# Patient Record
Sex: MALE | Race: WHITE | NOT HISPANIC OR LATINO | URBAN - METROPOLITAN AREA
[De-identification: names, ages, dates, MRNs, and addresses within clinical notes are randomized per-mention and may not be internally consistent; named-entity substitution may affect disease eponyms.]

---

## 2021-08-10 ENCOUNTER — APPOINTMENT (RX ONLY)
Dept: URBAN - METROPOLITAN AREA CLINIC 116 | Facility: CLINIC | Age: 56
Setting detail: DERMATOLOGY
End: 2021-08-10

## 2021-08-10 DIAGNOSIS — D49.2 NEOPLASM OF UNSPECIFIED BEHAVIOR OF BONE, SOFT TISSUE, AND SKIN: ICD-10-CM

## 2021-08-10 DIAGNOSIS — L91.8 OTHER HYPERTROPHIC DISORDERS OF THE SKIN: ICD-10-CM

## 2021-08-10 DIAGNOSIS — B36.0 PITYRIASIS VERSICOLOR: ICD-10-CM

## 2021-08-10 PROCEDURE — ? PRESCRIPTION

## 2021-08-10 PROCEDURE — ? TREATMENT REGIMEN

## 2021-08-10 PROCEDURE — 11102 TANGNTL BX SKIN SINGLE LES: CPT

## 2021-08-10 PROCEDURE — ? COUNSELING

## 2021-08-10 PROCEDURE — ? BIOPSY BY SHAVE METHOD

## 2021-08-10 PROCEDURE — ? SKIN TAG REMOVAL

## 2021-08-10 PROCEDURE — 11200 RMVL SKIN TAGS UP TO&INC 15: CPT | Mod: 59

## 2021-08-10 PROCEDURE — 99203 OFFICE O/P NEW LOW 30 MIN: CPT | Mod: 25

## 2021-08-10 RX ORDER — CICLOPIROX OLAMINE 7.7 MG/G
CREAM TOPICAL BID
Qty: 1 | Refills: 3 | Status: ERX | COMMUNITY
Start: 2021-08-10

## 2021-08-10 RX ADMIN — CICLOPIROX OLAMINE: 7.7 CREAM TOPICAL at 00:00

## 2021-08-10 ASSESSMENT — LOCATION ZONE DERM
LOCATION ZONE: TRUNK
LOCATION ZONE: NECK

## 2021-08-10 ASSESSMENT — LOCATION SIMPLE DESCRIPTION DERM
LOCATION SIMPLE: RIGHT ANTERIOR NECK
LOCATION SIMPLE: CHEST
LOCATION SIMPLE: UPPER BACK

## 2021-08-10 ASSESSMENT — LOCATION DETAILED DESCRIPTION DERM
LOCATION DETAILED: LEFT LATERAL INFERIOR CHEST
LOCATION DETAILED: SUPERIOR THORACIC SPINE
LOCATION DETAILED: RIGHT CLAVICULAR NECK

## 2021-08-10 NOTE — PROCEDURE: BIOPSY BY SHAVE METHOD
Detail Level: Detailed
Depth Of Biopsy: dermis
Was A Bandage Applied: Yes
Size Of Lesion In Cm: 1
X Size Of Lesion In Cm: 0
Biopsy Type: H and E
Biopsy Method: scissors
Anesthesia Type: 1% lidocaine without epinephrine
Hemostasis: Aluminum Chloride
Wound Care: Vaseline
Dressing: pressure dressing with telfa
Destruction After The Procedure: No
Type Of Destruction Used: Curettage
Curettage Text: The wound bed was treated with curettage after the biopsy was performed.
Cryotherapy Text: The wound bed was treated with cryotherapy after the biopsy was performed.
Electrodesiccation Text: The wound bed was treated with electrodesiccation after the biopsy was performed.
Electrodesiccation And Curettage Text: The wound bed was treated with electrodesiccation and curettage after the biopsy was performed.
Silver Nitrate Text: The wound bed was treated with silver nitrate after the biopsy was performed.
Lab: [facility] [place]
Lab Facility: [address]
Consent: The provider's intent is to obtain a tissue sample solely for diagnostic purposes. Written consent to obtain tissue sample was obtained and risks were reviewed including but not limited to scarring, infection, bleeding, scabbing, incomplete removal, nerve damage and allergy to anesthesia.
Post-Care Instructions: I reviewed with the patient in detail post-care instructions. Patient is to keep the biopsy site dry overnight, and then apply bacitracin twice daily until healed. Patient may apply hydrogen peroxide soaks to remove any crusting.
Notification Instructions: Patient will be notified of biopsy results. However, patient instructed to call the office if not contacted within 2 weeks.
Billing Type: [employer]
Information: Selecting Yes will display possible errors in your note based on the variables you have selected. This validation is only offered as a suggestion for you. PLEASE NOTE THAT THE VALIDATION TEXT WILL BE REMOVED WHEN YOU FINALIZE YOUR NOTE. IF YOU WANT TO FAX A PRELIMINARY NOTE YOU WILL NEED TO TOGGLE THIS TO 'NO' IF YOU DO NOT WANT IT IN YOUR FAXED NOTE.

## 2021-08-10 NOTE — PROCEDURE: SKIN TAG REMOVAL
Medical Necessity Clause: This procedure was medically necessary because the lesions that were treated were:
Add Associated Diagnoses If Applicable When Selecting Medical Necessity: Yes
Include Z78.9 (Other Specified Conditions Influencing Health Status) As An Associated Diagnosis?: No
Detail Level: Simple
Consent: Written consent obtained and the risks of skin tag removal was reviewed with the patient including but not limited to bleeding, pigmentary change, infection, pain, and remote possibility of scarring.
Anesthesia Volume In Cc: 3
Anesthesia Type: 1% lidocaine with epinephrine
Medical Necessity Information: It is in your best interest to select a reason for this procedure from the list below. All of these items fulfill various CMS LCD requirements except the new and changing color options.

## 2022-04-19 ENCOUNTER — OFFICE VISIT (OUTPATIENT)
Dept: URBAN - METROPOLITAN AREA CLINIC 7 | Facility: CLINIC | Age: 57
End: 2022-04-19

## 2022-05-27 ENCOUNTER — OFFICE VISIT (OUTPATIENT)
Dept: URBAN - METROPOLITAN AREA SURGERY CENTER 5 | Facility: SURGERY CENTER | Age: 57
End: 2022-05-27

## 2022-06-16 ENCOUNTER — TELEPHONE ENCOUNTER (OUTPATIENT)
Dept: URBAN - METROPOLITAN AREA CLINIC 9 | Facility: CLINIC | Age: 57
End: 2022-06-16

## 2022-06-28 ENCOUNTER — OFFICE VISIT (OUTPATIENT)
Dept: URBAN - METROPOLITAN AREA CLINIC 7 | Facility: CLINIC | Age: 57
End: 2022-06-28

## 2022-07-01 ENCOUNTER — TELEPHONE ENCOUNTER (OUTPATIENT)
Dept: URBAN - METROPOLITAN AREA CLINIC 9 | Facility: CLINIC | Age: 57
End: 2022-07-01

## 2022-07-14 ENCOUNTER — TELEPHONE ENCOUNTER (OUTPATIENT)
Dept: URBAN - METROPOLITAN AREA CLINIC 9 | Facility: CLINIC | Age: 57
End: 2022-07-14

## 2022-07-30 ENCOUNTER — TELEPHONE ENCOUNTER (OUTPATIENT)
Age: 57
End: 2022-07-30

## 2022-07-31 ENCOUNTER — TELEPHONE ENCOUNTER (OUTPATIENT)
Age: 57
End: 2022-07-31

## 2022-07-31 RX ORDER — PANTOPRAZOLE SODIUM 40 MG/1
1 (ONE) TABLET, DELAYED RELEASE ORAL
Qty: 0 | Refills: 16 | Status: ACTIVE | COMMUNITY
Start: 2022-06-16

## 2022-07-31 RX ORDER — PANTOPRAZOLE SODIUM 40 MG/1
1 (ONE) TABLET, DELAYED RELEASE ORAL
Qty: 0 | Refills: 16 | Status: ACTIVE | COMMUNITY
Start: 2022-06-14

## 2022-09-12 ENCOUNTER — TELEPHONE ENCOUNTER (OUTPATIENT)
Dept: URBAN - METROPOLITAN AREA CLINIC 9 | Facility: CLINIC | Age: 57
End: 2022-09-12

## 2022-09-12 RX ORDER — PANTOPRAZOLE SODIUM 40 MG/1
1 (ONE) TABLET, DELAYED RELEASE ORAL
Qty: 90 | Refills: 3

## 2022-09-13 ENCOUNTER — TELEPHONE ENCOUNTER (OUTPATIENT)
Dept: URBAN - METROPOLITAN AREA CLINIC 7 | Facility: CLINIC | Age: 57
End: 2022-09-13

## 2023-06-21 ENCOUNTER — LAB OUTSIDE AN ENCOUNTER (OUTPATIENT)
Dept: URBAN - METROPOLITAN AREA CLINIC 7 | Facility: CLINIC | Age: 58
End: 2023-06-21

## 2023-06-21 ENCOUNTER — OFFICE VISIT (OUTPATIENT)
Dept: URBAN - METROPOLITAN AREA CLINIC 7 | Facility: CLINIC | Age: 58
End: 2023-06-21
Payer: MEDICARE

## 2023-06-21 ENCOUNTER — WEB ENCOUNTER (OUTPATIENT)
Dept: URBAN - METROPOLITAN AREA CLINIC 7 | Facility: CLINIC | Age: 58
End: 2023-06-21

## 2023-06-21 VITALS
DIASTOLIC BLOOD PRESSURE: 70 MMHG | HEIGHT: 68 IN | WEIGHT: 276 LBS | BODY MASS INDEX: 41.83 KG/M2 | TEMPERATURE: 97.9 F | SYSTOLIC BLOOD PRESSURE: 120 MMHG

## 2023-06-21 DIAGNOSIS — K76.0 HEPATIC STEATOSIS: ICD-10-CM

## 2023-06-21 DIAGNOSIS — K22.70 BARRETT'S ESOPHAGUS: ICD-10-CM

## 2023-06-21 DIAGNOSIS — R10.32 LLQ PAIN: ICD-10-CM

## 2023-06-21 PROCEDURE — 99214 OFFICE O/P EST MOD 30 MIN: CPT | Performed by: INTERNAL MEDICINE

## 2023-06-21 RX ORDER — AMOXICILLIN AND CLAVULANATE POTASSIUM 875; 125 MG/1; MG/1
1 TABLET TABLET, FILM COATED ORAL EVERY 8 HOURS
Qty: 30 | Refills: 0 | OUTPATIENT
Start: 2023-06-21 | End: 2023-07-01

## 2023-06-21 RX ORDER — LISINOPRIL AND HYDROCHLOROTHIAZIDE 10; 12.5 MG/1; MG/1
1 TABLET TABLET ORAL ONCE A DAY
Status: ACTIVE | COMMUNITY

## 2023-06-21 RX ORDER — PANTOPRAZOLE SODIUM 40 MG/1
1 (ONE) TABLET, DELAYED RELEASE ORAL
Qty: 90 | Refills: 3 | Status: ACTIVE | COMMUNITY

## 2023-06-21 NOTE — HPI-TODAY'S VISIT:
Patient was last seen in the office in June 2022.  His initial evaluation was in April 2022 for chronic phlegm production with LPR per ENT no true or typical reflux symptoms and no dysphagia.  He did have an upper endoscopy several years ago with possible esophagitis while being worked up for bladder cancer.  Barium swallow more recently did demonstrate reflux and a small hiatal hernia.  EGD May 2022 demonstrated grade a esophagitis with a small hiatal hernia and a colonoscopy in May 2022 demonstrated 1 tubular adenoma with biopsies from the esophagus demonstrating possible Farr's and reflux changes.  AST and ALT were 45 and 65 respectively with a normal CBC and normal creatinine.  PPI was started daily.  The plan was to repeat his upper endoscopy in 1 year to reassess the Farr's diagnosis as a did question his Farr's diagnosis given he had inflammation at the time of the initial biopsies.  He did have evidence of fatty liver and elevated LFTs so did advise diet and exercise and modest efforts at weight loss.  His symptoms of phlegm production are chronic, to see if this improves along with acid suppression.  He did also have left sided abdominal spasms and plan was for CT scan.  He ultimately did have a CT scan with oral contrast but not IV contrast as an IV could not be started for the contrast.  He was treated with pantoprazole 40 mg once daily since that time.  CT demonstrated fatty liver, colon diverticulosis without inflammation, 2 mm right lower lobe pulmonary nodule, 9 mm left renal cyst.  Pancreas was normal, there is no lymphadenopathy. He had a CT 3/2023 sigmoid diverticulitis. Treated with antibiotics, which helped, and symptoms resolved. ALT 66. She is on pantoprazole 40 mg daily right now. Weight is down 15 lbs. Still has some phlegm issues in the throat. Labs were largely normal in March 2023.

## 2023-06-22 ENCOUNTER — TELEPHONE ENCOUNTER (OUTPATIENT)
Dept: URBAN - METROPOLITAN AREA CLINIC 8 | Facility: CLINIC | Age: 58
End: 2023-06-22

## 2023-06-22 ENCOUNTER — WEB ENCOUNTER (OUTPATIENT)
Dept: URBAN - METROPOLITAN AREA CLINIC 7 | Facility: CLINIC | Age: 58
End: 2023-06-22

## 2023-06-22 RX ORDER — AMOXICILLIN AND CLAVULANATE POTASSIUM 875; 125 MG/1; MG/1
1 TABLET TABLET, FILM COATED ORAL EVERY 8 HOURS
Qty: 30 | Refills: 0 | Status: ACTIVE | COMMUNITY
Start: 2023-06-21 | End: 2023-07-01

## 2023-06-22 RX ORDER — PANTOPRAZOLE SODIUM 40 MG/1
1 (ONE) TABLET, DELAYED RELEASE ORAL
Qty: 90 | Refills: 3 | Status: ACTIVE | COMMUNITY

## 2023-06-22 RX ORDER — LISINOPRIL AND HYDROCHLOROTHIAZIDE 10; 12.5 MG/1; MG/1
1 TABLET TABLET ORAL ONCE A DAY
Status: ACTIVE | COMMUNITY

## 2023-06-22 RX ORDER — DICYCLOMINE HYDROCHLORIDE 10 MG/1
AS DIRECTED CAPSULE ORAL
Qty: 60 | Refills: 3 | OUTPATIENT
Start: 2023-06-22 | End: 2023-10-20

## 2023-08-14 ENCOUNTER — WEB ENCOUNTER (OUTPATIENT)
Dept: URBAN - METROPOLITAN AREA CLINIC 7 | Facility: CLINIC | Age: 58
End: 2023-08-14

## 2023-08-22 ENCOUNTER — CLAIMS CREATED FROM THE CLAIM WINDOW (OUTPATIENT)
Dept: URBAN - METROPOLITAN AREA CLINIC 4 | Facility: CLINIC | Age: 58
End: 2023-08-22
Payer: MEDICARE

## 2023-08-22 ENCOUNTER — CLAIMS CREATED FROM THE CLAIM WINDOW (OUTPATIENT)
Dept: URBAN - METROPOLITAN AREA SURGERY CENTER 5 | Facility: SURGERY CENTER | Age: 58
End: 2023-08-22
Payer: MEDICARE

## 2023-08-22 DIAGNOSIS — K22.89 OTHER SPECIFIED DISEASE OF ESOPHAGUS: ICD-10-CM

## 2023-08-22 DIAGNOSIS — Z87.19 PERSONAL HISTORY OF OTHER DISEASES OF THE DIGESTIVE SYSTEM: ICD-10-CM

## 2023-08-22 DIAGNOSIS — K22.719 BARRETT'S ESOPHAGUS WITH DYSPLASIA, UNSPECIFIED: ICD-10-CM

## 2023-08-22 PROCEDURE — 43239 EGD BIOPSY SINGLE/MULTIPLE: CPT | Performed by: INTERNAL MEDICINE

## 2023-08-22 PROCEDURE — 43239 EGD BIOPSY SINGLE/MULTIPLE: CPT | Performed by: CLINIC/CENTER

## 2023-08-22 PROCEDURE — 88305 TISSUE EXAM BY PATHOLOGIST: CPT | Performed by: PATHOLOGY

## 2023-08-22 RX ORDER — DICYCLOMINE HYDROCHLORIDE 10 MG/1
AS DIRECTED CAPSULE ORAL
Qty: 60 | Refills: 3 | Status: ACTIVE | COMMUNITY
Start: 2023-06-22 | End: 2023-10-20

## 2023-08-22 RX ORDER — LISINOPRIL AND HYDROCHLOROTHIAZIDE 10; 12.5 MG/1; MG/1
1 TABLET TABLET ORAL ONCE A DAY
Status: ACTIVE | COMMUNITY

## 2023-08-22 RX ORDER — PANTOPRAZOLE SODIUM 40 MG/1
1 (ONE) TABLET, DELAYED RELEASE ORAL
Qty: 90 | Refills: 3 | Status: ACTIVE | COMMUNITY

## 2023-10-19 ENCOUNTER — OFFICE VISIT (OUTPATIENT)
Dept: URBAN - METROPOLITAN AREA CLINIC 7 | Facility: CLINIC | Age: 58
End: 2023-10-19
Payer: MEDICARE

## 2023-10-19 VITALS
BODY MASS INDEX: 41.98 KG/M2 | WEIGHT: 277 LBS | HEIGHT: 68 IN | DIASTOLIC BLOOD PRESSURE: 74 MMHG | HEART RATE: 80 BPM | TEMPERATURE: 97.8 F | SYSTOLIC BLOOD PRESSURE: 122 MMHG

## 2023-10-19 DIAGNOSIS — K76.0 HEPATIC STEATOSIS: ICD-10-CM

## 2023-10-19 DIAGNOSIS — R10.32 LLQ PAIN: ICD-10-CM

## 2023-10-19 DIAGNOSIS — K22.70 BARRETT'S ESOPHAGUS: ICD-10-CM

## 2023-10-19 PROCEDURE — 99214 OFFICE O/P EST MOD 30 MIN: CPT | Performed by: INTERNAL MEDICINE

## 2023-10-19 RX ORDER — LISINOPRIL AND HYDROCHLOROTHIAZIDE 10; 12.5 MG/1; MG/1
1 TABLET TABLET ORAL ONCE A DAY
Status: ACTIVE | COMMUNITY

## 2023-10-19 RX ORDER — FAMOTIDINE 40 MG/1
1 TABLET AT BEDTIME TABLET, FILM COATED ORAL ONCE A DAY
Qty: 30 | Refills: 3 | OUTPATIENT
Start: 2023-10-19

## 2023-10-19 RX ORDER — PANTOPRAZOLE SODIUM 40 MG/1
1 (ONE) TABLET, DELAYED RELEASE ORAL
Qty: 90 | Refills: 3 | Status: ACTIVE | COMMUNITY

## 2023-10-19 RX ORDER — DICYCLOMINE HYDROCHLORIDE 10 MG/1
AS DIRECTED CAPSULE ORAL
Qty: 60 | Refills: 3 | Status: DISCONTINUED | COMMUNITY
Start: 2023-06-22 | End: 2023-10-20

## 2023-10-19 NOTE — HPI-TODAY'S VISIT:
Patient was last seen in the office in June 2022.  His initial evaluation was in April 2022 for chronic phlegm production with LPR per ENT no true or typical reflux symptoms and no dysphagia.  He did have an upper endoscopy several years ago with possible esophagitis while being worked up for bladder cancer.  Barium swallow more recently did demonstrate reflux and a small hiatal hernia.  EGD May 2022 demonstrated grade a esophagitis with a small hiatal hernia and a colonoscopy in May 2022 demonstrated 1 tubular adenoma with biopsies from the esophagus demonstrating possible Farr's and reflux changes.  AST and ALT were 45 and 65 respectively with a normal CBC and normal creatinine.  PPI was started daily.  The plan was to repeat his upper endoscopy in 1 year to reassess the Farr's diagnosis as a did question his Farr's diagnosis given he had inflammation at the time of the initial biopsies.  He did have evidence of fatty liver and elevated LFTs so did advise diet and exercise and modest efforts at weight loss.  His symptoms of phlegm production are chronic, to see if this improves along with acid suppression.  He did also have left sided abdominal spasms and plan was for CT scan.  He ultimately did have a CT scan with oral contrast but not IV contrast as an IV could not be started for the contrast.  He was treated with pantoprazole 40 mg once daily since that time.  CT demonstrated fatty liver, colon diverticulosis without inflammation, 2 mm right lower lobe pulmonary nodule, 9 mm left renal cyst.  Pancreas was normal, there is no lymphadenopathy. He had a CT 3/2023 sigmoid diverticulitis. Treated with antibiotics, which helped, and symptoms resolved. ALT 66. She is on pantoprazole 40 mg daily right now. Weight is down 15 lbs. Still has some phlegm issues in the throat. Labs were largely normal in March 2023.  Plan last visit was to get a stat C. difficile test, CT scan, and ultimately pursue his EGD to tease out his diagnosis of Farr's esophagus.  He did improve on PPI therapy as far as his hoarseness and cough and so it was favored that these represented symptoms of reflux.  EGD in August 2023 did demonstrate a variable Z-line with otherwise normal exam and biopsies which demonstrated Farr's esophagus.  I did decide to keep him on once daily PPI and repeat EGD in 3 years for surveillance.  CT scan in June 2023 demonstrated diverticulosis but no evidence of bowel inflammation, no enlarged lymph nodes, normal pancreas.  I sent dicyclomine to his pharmacy to help with the pain but decided to hold off on any further antibiotics. He is here for FU of his EGD. Stools became more normal. He is on pantoprazole 40 mg daily, phlegm is still an issue, mucous production. No dysphagia.

## 2023-11-27 ENCOUNTER — WEB ENCOUNTER (OUTPATIENT)
Dept: URBAN - METROPOLITAN AREA CLINIC 7 | Facility: CLINIC | Age: 58
End: 2023-11-27

## 2023-11-27 RX ORDER — PANTOPRAZOLE SODIUM 40 MG/1
1 (ONE) TABLET, DELAYED RELEASE ORAL
Qty: 90 | Refills: 3

## 2024-01-09 ENCOUNTER — OFFICE VISIT (OUTPATIENT)
Dept: URBAN - METROPOLITAN AREA CLINIC 7 | Facility: CLINIC | Age: 59
End: 2024-01-09
Payer: MEDICARE

## 2024-01-09 ENCOUNTER — DASHBOARD ENCOUNTERS (OUTPATIENT)
Age: 59
End: 2024-01-09

## 2024-01-09 VITALS
BODY MASS INDEX: 41.83 KG/M2 | SYSTOLIC BLOOD PRESSURE: 130 MMHG | DIASTOLIC BLOOD PRESSURE: 76 MMHG | HEIGHT: 68 IN | TEMPERATURE: 97.6 F | WEIGHT: 276 LBS

## 2024-01-09 DIAGNOSIS — R09.82 POST-NASAL DRIP: ICD-10-CM

## 2024-01-09 DIAGNOSIS — R10.32 LLQ PAIN: ICD-10-CM

## 2024-01-09 DIAGNOSIS — K22.70 BARRETT'S ESOPHAGUS: ICD-10-CM

## 2024-01-09 DIAGNOSIS — K76.0 HEPATIC STEATOSIS: ICD-10-CM

## 2024-01-09 DIAGNOSIS — R05.3 CHRONIC COUGH: ICD-10-CM

## 2024-01-09 PROBLEM — 75803007: Status: ACTIVE | Noted: 2024-01-09

## 2024-01-09 PROBLEM — 68154008: Status: ACTIVE | Noted: 2024-01-09

## 2024-01-09 PROCEDURE — 99214 OFFICE O/P EST MOD 30 MIN: CPT | Performed by: INTERNAL MEDICINE

## 2024-01-09 RX ORDER — LORATADINE 10 MG
1 TABLET TABLET ORAL ONCE A DAY
Qty: 30 | Refills: 1 | OUTPATIENT
Start: 2024-01-09 | End: 2024-03-09

## 2024-01-09 RX ORDER — PANTOPRAZOLE SODIUM 40 MG/1
1 (ONE) TABLET, DELAYED RELEASE ORAL
Qty: 90 | Refills: 3 | Status: ACTIVE | COMMUNITY

## 2024-01-09 RX ORDER — FAMOTIDINE 40 MG/1
1 TABLET AT BEDTIME TABLET, FILM COATED ORAL ONCE A DAY
Qty: 30 | Refills: 3 | Status: ACTIVE | COMMUNITY
Start: 2023-10-19

## 2024-01-09 RX ORDER — TRIAMCINOLONE ACETONIDE 55 UG/1
1 SPRAY IN EACH NOSTRIL SPRAY, METERED NASAL ONCE A DAY
Qty: 1 APPLICATOR | Refills: 3 | OUTPATIENT
Start: 2024-01-09

## 2024-01-09 RX ORDER — FLUTICASONE PROPIONATE 50 UG/1
1 SPRAY IN EACH NOSTRIL SPRAY, METERED NASAL TWICE A DAY
Qty: 1 | Refills: 1 | OUTPATIENT
Start: 2024-01-09

## 2024-01-09 RX ORDER — LISINOPRIL AND HYDROCHLOROTHIAZIDE 10; 12.5 MG/1; MG/1
1 TABLET TABLET ORAL ONCE A DAY
Status: ACTIVE | COMMUNITY

## 2024-01-09 NOTE — HPI-TODAY'S VISIT:
BALDEMAR 10/2023. His initial evaluation was in April 2022 for chronic phlegm production with LPR per ENT no true or typical reflux symptoms and no dysphagia.  He did have an upper endoscopy several years ago with possible esophagitis while being worked up for bladder cancer.  Barium swallow more recently did demonstrate reflux and a small hiatal hernia.  EGD May 2022 demonstrated grade a esophagitis with a small hiatal hernia and a colonoscopy in May 2022 demonstrated 1 tubular adenoma with biopsies from the esophagus demonstrating possible Farr's and reflux changes.  AST and ALT were 45 and 65 respectively with a normal CBC and normal creatinine.  PPI was started daily.  The plan was to repeat his upper endoscopy in 1 year to reassess the Farr's diagnosis as a did question his Farr's diagnosis given he had inflammation at the time of the initial biopsies.  He did have evidence of fatty liver and elevated LFTs so did advise diet and exercise and modest efforts at weight loss.  His symptoms of phlegm production are chronic, to see if this improves along with acid suppression.  He did also have left sided abdominal spasms and plan was for CT scan.  He ultimately did have a CT scan with oral contrast but not IV contrast as an IV could not be started for the contrast.  He was treated with pantoprazole 40 mg once daily since that time.  CT demonstrated fatty liver, colon diverticulosis without inflammation, 2 mm right lower lobe pulmonary nodule, 9 mm left renal cyst.  Pancreas was normal, there is no lymphadenopathy. He had a CT 3/2023 sigmoid diverticulitis. Treated with antibiotics, which helped, and symptoms resolved. ALT 66. He is on pantoprazole 40 mg daily right now. Weight is down 15 lbs. Still has some phlegm issues in the throat. Labs were largely normal in March 2023. Plan last visit was to get a stat C. difficile test, CT scan, and ultimately pursue his EGD to tease out his diagnosis of Farr's esophagus.  He did improve on PPI therapy as far as his hoarseness and cough and so it was favored that these represented symptoms of reflux.  EGD in August 2023 did demonstrate a variable Z-line with otherwise normal exam and biopsies which demonstrated Farr's esophagus.  I did decide to keep him on once daily PPI and repeat EGD in 3 years for surveillance.  CT scan in June 2023 demonstrated diverticulosis but no evidence of bowel inflammation, no enlarged lymph nodes, normal pancreas.  I sent dicyclomine to his pharmacy to help with the pain but decided to hold off on any further antibiotics. He is here for FU of his EGD. Stools became more normal. He is on pantoprazole 40 mg daily, phlegm is still an issue, mucous production. No dysphagia.  Last visit, plan was to do his EGD in 3 years for Farr's surveillance, continue his morning PPI, and added famotidine at bedtime to help with mucus and phlegm production in the case that this would be secondary to reflux. He tell me that famotidine did not really with cough, phlegm, mucous. No new symptoms. Continues pantoprazole.

## 2024-01-12 ENCOUNTER — WEB ENCOUNTER (OUTPATIENT)
Dept: URBAN - METROPOLITAN AREA CLINIC 7 | Facility: CLINIC | Age: 59
End: 2024-01-12

## 2024-01-12 RX ORDER — TRIAMCINOLONE ACETONIDE 55 UG/1
1 SPRAY IN EACH NOSTRIL SPRAY, METERED NASAL ONCE A DAY
Qty: 1 APPLICATOR | Refills: 3
Start: 2024-01-09

## 2024-05-21 ENCOUNTER — WEB ENCOUNTER (OUTPATIENT)
Dept: URBAN - METROPOLITAN AREA CLINIC 7 | Facility: CLINIC | Age: 59
End: 2024-05-21

## 2024-05-22 ENCOUNTER — LAB OUTSIDE AN ENCOUNTER (OUTPATIENT)
Dept: URBAN - METROPOLITAN AREA CLINIC 7 | Facility: CLINIC | Age: 59
End: 2024-05-22

## 2024-06-10 ENCOUNTER — LAB OUTSIDE AN ENCOUNTER (OUTPATIENT)
Dept: URBAN - METROPOLITAN AREA CLINIC 7 | Facility: CLINIC | Age: 59
End: 2024-06-10

## 2024-06-12 ENCOUNTER — LAB OUTSIDE AN ENCOUNTER (OUTPATIENT)
Dept: URBAN - METROPOLITAN AREA CLINIC 7 | Facility: CLINIC | Age: 59
End: 2024-06-12

## 2024-06-12 ENCOUNTER — TELEPHONE ENCOUNTER (OUTPATIENT)
Dept: URBAN - METROPOLITAN AREA CLINIC 7 | Facility: CLINIC | Age: 59
End: 2024-06-12

## 2024-06-14 ENCOUNTER — TELEPHONE ENCOUNTER (OUTPATIENT)
Dept: URBAN - METROPOLITAN AREA CLINIC 7 | Facility: CLINIC | Age: 59
End: 2024-06-14

## 2024-07-02 ENCOUNTER — WEB ENCOUNTER (OUTPATIENT)
Dept: URBAN - METROPOLITAN AREA CLINIC 7 | Facility: CLINIC | Age: 59
End: 2024-07-02

## 2024-07-03 ENCOUNTER — TELEPHONE ENCOUNTER (OUTPATIENT)
Dept: URBAN - METROPOLITAN AREA CLINIC 7 | Facility: CLINIC | Age: 59
End: 2024-07-03

## 2024-07-11 ENCOUNTER — WEB ENCOUNTER (OUTPATIENT)
Dept: URBAN - METROPOLITAN AREA CLINIC 7 | Facility: CLINIC | Age: 59
End: 2024-07-11

## 2024-07-12 ENCOUNTER — LAB OUTSIDE AN ENCOUNTER (OUTPATIENT)
Dept: URBAN - METROPOLITAN AREA CLINIC 7 | Facility: CLINIC | Age: 59
End: 2024-07-12

## 2024-07-23 ENCOUNTER — WEB ENCOUNTER (OUTPATIENT)
Dept: URBAN - METROPOLITAN AREA CLINIC 7 | Facility: CLINIC | Age: 59
End: 2024-07-23

## 2024-07-26 ENCOUNTER — TELEPHONE ENCOUNTER (OUTPATIENT)
Dept: URBAN - METROPOLITAN AREA CLINIC 7 | Facility: CLINIC | Age: 59
End: 2024-07-26

## 2024-07-26 ENCOUNTER — LAB OUTSIDE AN ENCOUNTER (OUTPATIENT)
Dept: URBAN - METROPOLITAN AREA CLINIC 7 | Facility: CLINIC | Age: 59
End: 2024-07-26

## 2024-07-31 ENCOUNTER — TELEPHONE ENCOUNTER (OUTPATIENT)
Dept: URBAN - METROPOLITAN AREA SURGERY CENTER 5 | Facility: SURGERY CENTER | Age: 59
End: 2024-07-31

## 2024-08-13 PROBLEM — 62484002: Status: ACTIVE | Noted: 2024-08-13

## 2024-08-14 ENCOUNTER — OFFICE VISIT (OUTPATIENT)
Dept: URBAN - METROPOLITAN AREA CLINIC 7 | Facility: CLINIC | Age: 59
End: 2024-08-14
Payer: MEDICARE

## 2024-08-14 ENCOUNTER — TELEPHONE ENCOUNTER (OUTPATIENT)
Dept: URBAN - METROPOLITAN AREA CLINIC 7 | Facility: CLINIC | Age: 59
End: 2024-08-14

## 2024-08-14 VITALS
RESPIRATION RATE: 16 BRPM | WEIGHT: 263 LBS | HEIGHT: 68 IN | SYSTOLIC BLOOD PRESSURE: 130 MMHG | BODY MASS INDEX: 39.86 KG/M2 | TEMPERATURE: 97.6 F | DIASTOLIC BLOOD PRESSURE: 70 MMHG

## 2024-08-14 DIAGNOSIS — K22.70 BARRETT'S ESOPHAGUS: ICD-10-CM

## 2024-08-14 DIAGNOSIS — K74.00 LIVER FIBROSIS: ICD-10-CM

## 2024-08-14 DIAGNOSIS — R09.82 POST-NASAL DRIP: ICD-10-CM

## 2024-08-14 DIAGNOSIS — R05.3 CHRONIC COUGH: ICD-10-CM

## 2024-08-14 DIAGNOSIS — K75.81 METABOLIC DYSFUNCTION-ASSOCIATED STEATOHEPATITIS (MASH): ICD-10-CM

## 2024-08-14 DIAGNOSIS — R10.32 LLQ PAIN: ICD-10-CM

## 2024-08-14 DIAGNOSIS — K76.0 HEPATIC STEATOSIS: ICD-10-CM

## 2024-08-14 PROCEDURE — 99214 OFFICE O/P EST MOD 30 MIN: CPT | Performed by: INTERNAL MEDICINE

## 2024-08-14 RX ORDER — FAMOTIDINE 40 MG/1
1 TABLET AT BEDTIME TABLET, FILM COATED ORAL ONCE A DAY
Qty: 30 | Refills: 3 | Status: ACTIVE | COMMUNITY
Start: 2023-10-19

## 2024-08-14 RX ORDER — PANTOPRAZOLE SODIUM 40 MG/1
1 (ONE) TABLET, DELAYED RELEASE ORAL
Qty: 90 | Refills: 3 | Status: ACTIVE | COMMUNITY

## 2024-08-14 RX ORDER — RESMETIROM 100 MG/1
1 TABLET TABLET, COATED ORAL ONCE DAILY
Qty: 90 | Refills: 3 | OUTPATIENT
Start: 2024-08-14

## 2024-08-14 RX ORDER — LISINOPRIL AND HYDROCHLOROTHIAZIDE 10; 12.5 MG/1; MG/1
1 TABLET TABLET ORAL ONCE A DAY
Status: ACTIVE | COMMUNITY

## 2024-08-14 RX ORDER — TRIAMCINOLONE ACETONIDE 55 UG/1
1 SPRAY IN EACH NOSTRIL SPRAY, METERED NASAL ONCE A DAY
Qty: 1 APPLICATOR | Refills: 3 | Status: ACTIVE | COMMUNITY
Start: 2024-01-09

## 2024-08-14 RX ORDER — FLUTICASONE PROPIONATE 50 UG/1
1 SPRAY IN EACH NOSTRIL SPRAY, METERED NASAL TWICE A DAY
Qty: 1 | Refills: 1 | Status: ACTIVE | COMMUNITY
Start: 2024-01-09

## 2024-08-14 NOTE — HPI-TODAY'S VISIT:
BALDEMAR 1/2024. His initial evaluation was in April 2022 for chronic phlegm production with LPR per ENT no true or typical reflux symptoms and no dysphagia.  He did have an upper endoscopy several years ago with possible esophagitis while being worked up for bladder cancer.  Barium swallow more recently did demonstrate reflux and a small hiatal hernia.  EGD May 2022 demonstrated grade A esophagitis with a small hiatal hernia and a colonoscopy in May 2022 demonstrated 1 tubular adenoma with biopsies from the esophagus demonstrating possible Farr's and reflux changes.  AST and ALT were 45 and 65 respectively with a normal CBC and normal creatinine.  PPI was started daily.  The plan was to repeat his upper endoscopy in 1 year to reassess the Farr's diagnosis as a did question his Farr's diagnosis given he had inflammation at the time of the initial biopsies.  He did have evidence of fatty liver and elevated LFTs so did advise diet and exercise and modest efforts at weight loss.  His symptoms of phlegm production are chronic, to see if this improves along with acid suppression.  He did also have left sided abdominal spasms and plan was for CT scan.  He ultimately did have a CT scan with oral contrast but not IV contrast as an IV could not be started for the contrast.  He was treated with pantoprazole 40 mg once daily since that time.  CT demonstrated fatty liver, colon diverticulosis without inflammation, 2 mm right lower lobe pulmonary nodule, 9 mm left renal cyst.  Pancreas was normal, there is no lymphadenopathy. He had a CT 3/2023 sigmoid diverticulitis. Treated with antibiotics, which helped, and symptoms resolved. ALT 66. He is on pantoprazole 40 mg daily right now. Weight is down 15 lbs. Still has some phlegm issues in the throat. Labs were largely normal in March 2023. Plan last visit was to get a stat C. difficile test, CT scan, and ultimately pursue his EGD to tease out his diagnosis of Farr's esophagus.  He did improve on PPI therapy as far as his hoarseness and cough and so it was favored that these represented symptoms of reflux.  EGD in August 2023 did demonstrate a variable Z-line with otherwise normal exam and biopsies which demonstrated Farr's esophagus.  I did decide to keep him on once daily PPI and repeat EGD in 3 years for surveillance.  CT scan in June 2023 demonstrated diverticulosis but no evidence of bowel inflammation, no enlarged lymph nodes, normal pancreas.  I sent dicyclomine to his pharmacy to help with the pain but decided to hold off on any further antibiotics. He is here for FU of his EGD. Stools became more normal. He is on pantoprazole 40 mg daily, phlegm is still an issue, mucous production. No dysphagia. Last visit, plan was to do his EGD in 3 years for Farr's surveillance, continue his morning PPI, and added famotidine at bedtime to help with mucus and phlegm production in the case that this would be secondary to reflux. He tell me that famotidine did not really with cough, phlegm, mucous. No new symptoms. Continues pantoprazole. Plan was to stop famotidine at this point, no benefit. Will try claritin/nasacort for postnasal drip, allergic rhinitis. EGD in 2026, colon in 2027. Sent msg since LV regarding elevated liver enzymes and so did workup for this. Fibroscan with KPA 20, F4 fibrosis, S3 steatosis, MRI with fatty liver, no cirrhotic changes, no HCC. Liver biopsy ultimately pursued showed 70% steatosis, and F3 fibrotic changes (some F4 but not overall consistent with advanced cirrhosis). FU now.  He has lost 13 lbs since last visit, eating healthier. He does have fam hx of cirrhosis in his brother. He does not have hx of any cirrhotic signs, no decompensation.

## 2024-08-15 ENCOUNTER — WEB ENCOUNTER (OUTPATIENT)
Dept: URBAN - METROPOLITAN AREA CLINIC 7 | Facility: CLINIC | Age: 59
End: 2024-08-15

## 2024-08-27 ENCOUNTER — WEB ENCOUNTER (OUTPATIENT)
Dept: URBAN - METROPOLITAN AREA CLINIC 7 | Facility: CLINIC | Age: 59
End: 2024-08-27

## 2024-09-16 ENCOUNTER — WEB ENCOUNTER (OUTPATIENT)
Dept: URBAN - METROPOLITAN AREA CLINIC 7 | Facility: CLINIC | Age: 59
End: 2024-09-16

## 2024-10-09 ENCOUNTER — OFFICE VISIT (OUTPATIENT)
Dept: URBAN - METROPOLITAN AREA CLINIC 7 | Facility: CLINIC | Age: 59
End: 2024-10-09

## 2024-10-22 LAB
ALBUMIN/GLOBULIN RATIO: 1.6
ALBUMIN: 4.4
ALKALINE PHOSPHATASE: 51
ALT (SGPT): 45
AST (SGOT): 24
BILIRUBIN, DIRECT: 0.2
BILIRUBIN, INDIRECT: 0.5
BILIRUBIN, TOTAL: 0.7
GLOBULIN: 2.8
PROTEIN, TOTAL: 7.2

## 2024-11-18 ENCOUNTER — LAB OUTSIDE AN ENCOUNTER (OUTPATIENT)
Dept: URBAN - METROPOLITAN AREA CLINIC 7 | Facility: CLINIC | Age: 59
End: 2024-11-18

## 2024-11-26 ENCOUNTER — TELEPHONE ENCOUNTER (OUTPATIENT)
Dept: URBAN - METROPOLITAN AREA CLINIC 7 | Facility: CLINIC | Age: 59
End: 2024-11-26

## 2024-12-16 ENCOUNTER — WEB ENCOUNTER (OUTPATIENT)
Dept: URBAN - METROPOLITAN AREA CLINIC 7 | Facility: CLINIC | Age: 59
End: 2024-12-16

## 2024-12-16 RX ORDER — PANTOPRAZOLE SODIUM 20 MG/1
1 TABLET TABLET, DELAYED RELEASE ORAL
Qty: 45 | Refills: 3

## 2025-01-10 ENCOUNTER — WEB ENCOUNTER (OUTPATIENT)
Dept: URBAN - METROPOLITAN AREA CLINIC 7 | Facility: CLINIC | Age: 60
End: 2025-01-10

## 2025-01-16 ENCOUNTER — TELEPHONE ENCOUNTER (OUTPATIENT)
Dept: URBAN - METROPOLITAN AREA CLINIC 7 | Facility: CLINIC | Age: 60
End: 2025-01-16

## 2025-01-17 ENCOUNTER — WEB ENCOUNTER (OUTPATIENT)
Dept: URBAN - METROPOLITAN AREA CLINIC 7 | Facility: CLINIC | Age: 60
End: 2025-01-17

## 2025-01-22 LAB
ALBUMIN/GLOBULIN RATIO: 1.6
ALBUMIN: 4.6
ALKALINE PHOSPHATASE: 45
ALT (SGPT): 55
AST (SGOT): 33
BILIRUBIN, DIRECT: 0.2
BILIRUBIN, INDIRECT: 0.9
BILIRUBIN, TOTAL: 1.1
GLOBULIN: 2.8
PROTEIN, TOTAL: 7.4

## 2025-02-03 ENCOUNTER — LAB OUTSIDE AN ENCOUNTER (OUTPATIENT)
Dept: URBAN - METROPOLITAN AREA CLINIC 7 | Facility: CLINIC | Age: 60
End: 2025-02-03

## 2025-02-05 ENCOUNTER — WEB ENCOUNTER (OUTPATIENT)
Dept: URBAN - METROPOLITAN AREA CLINIC 7 | Facility: CLINIC | Age: 60
End: 2025-02-05

## 2025-02-11 ENCOUNTER — TELEPHONE ENCOUNTER (OUTPATIENT)
Dept: URBAN - METROPOLITAN AREA CLINIC 7 | Facility: CLINIC | Age: 60
End: 2025-02-11

## 2025-02-24 ENCOUNTER — TELEPHONE ENCOUNTER (OUTPATIENT)
Dept: URBAN - METROPOLITAN AREA CLINIC 6 | Facility: CLINIC | Age: 60
End: 2025-02-24

## 2025-04-29 LAB
ALBUMIN/GLOBULIN RATIO: 1.7
ALBUMIN: 4.4
ALKALINE PHOSPHATASE: 44
ALT (SGPT): 37
AST (SGOT): 23
BILIRUBIN, DIRECT: 0.2
BILIRUBIN, INDIRECT: 0.9
BILIRUBIN, TOTAL: 1.1
GLOBULIN: 2.6
PROTEIN, TOTAL: 7

## 2025-05-01 ENCOUNTER — WEB ENCOUNTER (OUTPATIENT)
Dept: URBAN - METROPOLITAN AREA CLINIC 7 | Facility: CLINIC | Age: 60
End: 2025-05-01

## 2025-05-01 ENCOUNTER — LAB OUTSIDE AN ENCOUNTER (OUTPATIENT)
Dept: URBAN - METROPOLITAN AREA CLINIC 7 | Facility: CLINIC | Age: 60
End: 2025-05-01

## 2025-05-20 ENCOUNTER — WEB ENCOUNTER (OUTPATIENT)
Dept: URBAN - METROPOLITAN AREA CLINIC 7 | Facility: CLINIC | Age: 60
End: 2025-05-20

## 2025-07-07 ENCOUNTER — WEB ENCOUNTER (OUTPATIENT)
Dept: URBAN - METROPOLITAN AREA CLINIC 7 | Facility: CLINIC | Age: 60
End: 2025-07-07

## 2025-07-07 RX ORDER — RESMETIROM 100 MG/1
1 TABLET TABLET, COATED ORAL ONCE DAILY
Qty: 90 | Refills: 3
Start: 2024-08-14

## 2025-08-28 ENCOUNTER — LAB OUTSIDE AN ENCOUNTER (OUTPATIENT)
Dept: URBAN - METROPOLITAN AREA CLINIC 7 | Facility: CLINIC | Age: 60
End: 2025-08-28

## 2025-08-29 LAB
ALBUMIN/GLOBULIN RATIO: 1.6
ALBUMIN: 4.4
ALKALINE PHOSPHATASE: 37
ALT (SGPT): 41
AST (SGOT): 27
BILIRUBIN, DIRECT: 0.2
BILIRUBIN, INDIRECT: 0.9
BILIRUBIN, TOTAL: 1.1
GLOBULIN: 2.7
PROTEIN, TOTAL: 7.1